# Patient Record
Sex: FEMALE | Race: WHITE | NOT HISPANIC OR LATINO | Employment: FULL TIME | URBAN - METROPOLITAN AREA
[De-identification: names, ages, dates, MRNs, and addresses within clinical notes are randomized per-mention and may not be internally consistent; named-entity substitution may affect disease eponyms.]

---
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
## 2020-11-14 ENCOUNTER — OFFICE VISIT (OUTPATIENT)
Dept: URGENT CARE | Facility: CLINIC | Age: 20
End: 2020-11-14
Payer: COMMERCIAL

## 2020-11-14 VITALS
BODY MASS INDEX: 29.34 KG/M2 | RESPIRATION RATE: 18 BRPM | HEART RATE: 88 BPM | OXYGEN SATURATION: 99 % | SYSTOLIC BLOOD PRESSURE: 98 MMHG | HEIGHT: 64 IN | DIASTOLIC BLOOD PRESSURE: 55 MMHG | WEIGHT: 171.88 LBS | TEMPERATURE: 99 F
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
## 2020-11-14 DIAGNOSIS — M54.50 RIGHT-SIDED LOW BACK PAIN WITHOUT SCIATICA, UNSPECIFIED CHRONICITY: Primary | ICD-10-CM

## 2020-11-14 PROCEDURE — 99214 OFFICE O/P EST MOD 30 MIN: CPT | Mod: S$GLB,,, | Performed by: NURSE PRACTITIONER

## 2020-11-14 PROCEDURE — 99214 PR OFFICE/OUTPT VISIT, EST, LEVL IV, 30-39 MIN: ICD-10-PCS | Mod: S$GLB,,, | Performed by: NURSE PRACTITIONER

## 2020-11-14 RX ORDER — METHOCARBAMOL 500 MG/1
500 TABLET, FILM COATED ORAL 4 TIMES DAILY
Qty: 28 TABLET | Refills: 0 | Status: SHIPPED | OUTPATIENT
Start: 2020-11-14 | End: 2020-11-21
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
## 2020-11-17 ENCOUNTER — TELEPHONE (OUTPATIENT)
Dept: URGENT CARE | Facility: CLINIC | Age: 20
End: 2020-11-17
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
## 2022-03-17 PROBLEM — K11.8 PAROTID MASS: Status: ACTIVE | Noted: 2022-03-17

## 2022-03-17 PROBLEM — N93.9 VAGINAL SPOTTING: Status: ACTIVE | Noted: 2022-03-17

## 2022-03-17 PROBLEM — K11.8 PAROTID MASS: Status: RESOLVED | Noted: 2019-11-07 | Resolved: 2022-03-17

## 2022-04-27 ENCOUNTER — PATIENT MESSAGE (OUTPATIENT)
Dept: ADMINISTRATIVE | Facility: HOSPITAL | Age: 22
End: 2022-04-27
Payer: COMMERCIAL

## 2022-04-27 PROBLEM — A59.9 INFECTION DUE TO TRICHOMONAS: Status: ACTIVE | Noted: 2022-04-27

## 2022-04-27 PROBLEM — N93.9 VAGINAL SPOTTING: Status: RESOLVED | Noted: 2022-03-17 | Resolved: 2022-04-27

## 2022-04-27 PROBLEM — A74.9 CHLAMYDIA: Status: ACTIVE | Noted: 2022-04-27

## 2022-10-12 ENCOUNTER — PATIENT OUTREACH (OUTPATIENT)
Dept: ADMINISTRATIVE | Facility: HOSPITAL | Age: 22
End: 2022-10-12
Payer: COMMERCIAL